# Patient Record
Sex: FEMALE | Race: WHITE | NOT HISPANIC OR LATINO | Employment: FULL TIME | URBAN - METROPOLITAN AREA
[De-identification: names, ages, dates, MRNs, and addresses within clinical notes are randomized per-mention and may not be internally consistent; named-entity substitution may affect disease eponyms.]

---

## 2023-12-20 ENCOUNTER — HOSPITAL ENCOUNTER (EMERGENCY)
Facility: CLINIC | Age: 23
Discharge: HOME | End: 2023-12-21
Attending: EMERGENCY MEDICINE | Admitting: EMERGENCY MEDICINE
Payer: COMMERCIAL

## 2023-12-20 ENCOUNTER — APPOINTMENT (OUTPATIENT)
Dept: GENERAL RADIOLOGY | Facility: CLINIC | Age: 23
End: 2023-12-20
Attending: EMERGENCY MEDICINE
Payer: COMMERCIAL

## 2023-12-20 DIAGNOSIS — R51.9 NONINTRACTABLE HEADACHE, UNSPECIFIED CHRONICITY PATTERN, UNSPECIFIED HEADACHE TYPE: ICD-10-CM

## 2023-12-20 LAB
ANION GAP SERPL CALCULATED.3IONS-SCNC: 11 MMOL/L (ref 7–15)
ATRIAL RATE - MUSE: 73 BPM
BASOPHILS # BLD AUTO: 0 10E3/UL (ref 0–0.2)
BASOPHILS NFR BLD AUTO: 1 %
BUN SERPL-MCNC: 11.2 MG/DL (ref 6–20)
CALCIUM SERPL-MCNC: 9.5 MG/DL (ref 8.6–10)
CHLORIDE SERPL-SCNC: 102 MMOL/L (ref 98–107)
CREAT SERPL-MCNC: 0.72 MG/DL (ref 0.51–0.95)
DEPRECATED HCO3 PLAS-SCNC: 26 MMOL/L (ref 22–29)
DIASTOLIC BLOOD PRESSURE - MUSE: NORMAL MMHG
EGFRCR SERPLBLD CKD-EPI 2021: >90 ML/MIN/1.73M2
EOSINOPHIL # BLD AUTO: 0.4 10E3/UL (ref 0–0.7)
EOSINOPHIL NFR BLD AUTO: 6 %
ERYTHROCYTE [DISTWIDTH] IN BLOOD BY AUTOMATED COUNT: 12.2 % (ref 10–15)
GLUCOSE SERPL-MCNC: 99 MG/DL (ref 70–99)
HCG SERPL QL: NEGATIVE
HCT VFR BLD AUTO: 38.1 % (ref 35–47)
HGB BLD-MCNC: 13.1 G/DL (ref 11.7–15.7)
HOLD SPECIMEN: NORMAL
IMM GRANULOCYTES # BLD: 0 10E3/UL
IMM GRANULOCYTES NFR BLD: 0 %
INTERPRETATION ECG - MUSE: NORMAL
LYMPHOCYTES # BLD AUTO: 2.3 10E3/UL (ref 0.8–5.3)
LYMPHOCYTES NFR BLD AUTO: 37 %
MCH RBC QN AUTO: 30.8 PG (ref 26.5–33)
MCHC RBC AUTO-ENTMCNC: 34.4 G/DL (ref 31.5–36.5)
MCV RBC AUTO: 90 FL (ref 78–100)
MONOCYTES # BLD AUTO: 0.4 10E3/UL (ref 0–1.3)
MONOCYTES NFR BLD AUTO: 7 %
NEUTROPHILS # BLD AUTO: 3 10E3/UL (ref 1.6–8.3)
NEUTROPHILS NFR BLD AUTO: 49 %
NRBC # BLD AUTO: 0 10E3/UL
NRBC BLD AUTO-RTO: 0 /100
P AXIS - MUSE: -11 DEGREES
PLATELET # BLD AUTO: 408 10E3/UL (ref 150–450)
POTASSIUM SERPL-SCNC: 4.1 MMOL/L (ref 3.4–5.3)
PR INTERVAL - MUSE: 104 MS
QRS DURATION - MUSE: 78 MS
QT - MUSE: 370 MS
QTC - MUSE: 407 MS
R AXIS - MUSE: -29 DEGREES
RBC # BLD AUTO: 4.25 10E6/UL (ref 3.8–5.2)
SODIUM SERPL-SCNC: 139 MMOL/L (ref 135–145)
SYSTOLIC BLOOD PRESSURE - MUSE: NORMAL MMHG
T AXIS - MUSE: -19 DEGREES
VENTRICULAR RATE- MUSE: 73 BPM
WBC # BLD AUTO: 6.1 10E3/UL (ref 4–11)

## 2023-12-20 PROCEDURE — 99285 EMERGENCY DEPT VISIT HI MDM: CPT | Mod: 25

## 2023-12-20 PROCEDURE — 36415 COLL VENOUS BLD VENIPUNCTURE: CPT | Performed by: EMERGENCY MEDICINE

## 2023-12-20 PROCEDURE — 80048 BASIC METABOLIC PNL TOTAL CA: CPT | Performed by: EMERGENCY MEDICINE

## 2023-12-20 PROCEDURE — 84703 CHORIONIC GONADOTROPIN ASSAY: CPT | Performed by: EMERGENCY MEDICINE

## 2023-12-20 PROCEDURE — 71046 X-RAY EXAM CHEST 2 VIEWS: CPT

## 2023-12-20 PROCEDURE — 85025 COMPLETE CBC W/AUTO DIFF WBC: CPT | Performed by: EMERGENCY MEDICINE

## 2023-12-20 PROCEDURE — 85014 HEMATOCRIT: CPT | Performed by: EMERGENCY MEDICINE

## 2023-12-20 PROCEDURE — 93005 ELECTROCARDIOGRAM TRACING: CPT

## 2023-12-21 VITALS
DIASTOLIC BLOOD PRESSURE: 76 MMHG | SYSTOLIC BLOOD PRESSURE: 122 MMHG | HEART RATE: 75 BPM | OXYGEN SATURATION: 99 % | TEMPERATURE: 98.1 F | WEIGHT: 150 LBS | RESPIRATION RATE: 16 BRPM

## 2023-12-21 LAB — TROPONIN T SERPL HS-MCNC: <6 NG/L

## 2023-12-21 PROCEDURE — 84484 ASSAY OF TROPONIN QUANT: CPT | Performed by: EMERGENCY MEDICINE

## 2023-12-21 PROCEDURE — 36415 COLL VENOUS BLD VENIPUNCTURE: CPT | Performed by: EMERGENCY MEDICINE

## 2023-12-21 PROCEDURE — 258N000003 HC RX IP 258 OP 636: Performed by: EMERGENCY MEDICINE

## 2023-12-21 RX ORDER — DEXAMETHASONE SODIUM PHOSPHATE 4 MG/ML
10 INJECTION, SOLUTION INTRA-ARTICULAR; INTRALESIONAL; INTRAMUSCULAR; INTRAVENOUS; SOFT TISSUE ONCE
Status: COMPLETED | OUTPATIENT
Start: 2023-12-21 | End: 2023-12-21

## 2023-12-21 RX ADMIN — SODIUM CHLORIDE, POTASSIUM CHLORIDE, SODIUM LACTATE AND CALCIUM CHLORIDE 1000 ML: 600; 310; 30; 20 INJECTION, SOLUTION INTRAVENOUS at 01:02

## 2023-12-21 ASSESSMENT — ACTIVITIES OF DAILY LIVING (ADL)
ADLS_ACUITY_SCORE: 35
ADLS_ACUITY_SCORE: 35

## 2023-12-21 NOTE — ED PROVIDER NOTES
History     Chief Complaint:  Headache and Chest Pain       The history is provided by the patient.      Mildred Muro is a 23 year old female with history of migraine who presents to the ED with headache and chest pain. Patient reports experiencing intermittent headache and chest pain for the past 10 days. Of note, headache feels different from migraine. She describes the headache as pressure on the right side and center of forehead. Additional symptoms include sensitivity to light and neck stiffness. Has been taking Tylenol and Excedrin for pain, but not today. Denies coughing, sore throat, nausea, and diarrhea. Patient moved 2 months ago and began experience clear drainage out of her nose intermittently. She has been seen for this a couple times and been given antibiotics for what seemed to be a sinus infection.      Independent Historian:   None - Patient Only    Review of External Notes:   I reviewed the [place] ED note from 8/31/23 for headache     Medications:    Tylenol   Excedrin  Inderal  Prozac    Past Medical History:    PCOS  Acne vulgaris  Astigmatism   Generalized anxiety disorder   Depression     Past Surgical History:    Tonsillectomy  Strabismus surgery, bilateral  [place] teeth extraction      Physical Exam   Patient Vitals for the past 24 hrs:   BP Temp Temp src Pulse Resp SpO2 Weight   12/21/23 0317 122/76 -- -- 75 -- 99 % --   12/20/23 2212 124/73 98.1  F (36.7  C) Temporal 74 16 99 % 68 kg (150 lb)     Physical Exam  General: Appears well-developed and well-nourished.   Head: No signs of trauma.   Mouth/Throat: Oropharynx is clear and moist.   Nose: No drainage noted  Eyes: Conjunctivae are normal. Pupils are equal, round, and reactive to light.   Neck: Normal range of motion. No nuchal rigidity. No cervical adenopathy  CV: Normal rate and regular rhythm.    Resp: Effort normal and breath sounds normal. No respiratory distress.   GI: Soft. There is no tenderness.  No rebound or guarding.   Normal bowel sounds.  No CVA tenderness.  MSK: Normal range of motion.   Neuro: The patient is alert and oriented to person, place, and time.  PERRLA, EOMI, strength in upper/lower extremities normal and symmetrical. Sensation normal. Speech normal.  Skin: Skin is warm and dry. No rash noted.   Psych: normal mood and affect. behavior is normal.       Emergency Department Course   ECG  ECG taken at 2220, ECG read at 2229  Sinus rhythm with short WI inferior infract, age undetermined   Rate 73 bpm. WI interval 104 ms. QRS duration 78 ms. QT/QTc 370/407 ms. P-R-T axes -11 -29 -19.     Imaging:  XR Chest 2 Views   Final Result   IMPRESSION: Negative chest.         Laboratory:  Labs Ordered and Resulted from Time of ED Arrival to Time of ED Departure   BASIC METABOLIC PANEL - Normal       Result Value    Sodium 139      Potassium 4.1      Chloride 102      Carbon Dioxide (CO2) 26      Anion Gap 11      Urea Nitrogen 11.2      Creatinine 0.72      GFR Estimate >90      Calcium 9.5      Glucose 99     TROPONIN T, HIGH SENSITIVITY - Normal    Troponin T, High Sensitivity <6     HCG QUALITATIVE PREGNANCY - Normal    hCG Serum Qualitative Negative     CBC WITH PLATELETS AND DIFFERENTIAL    WBC Count 6.1      RBC Count 4.25      Hemoglobin 13.1      Hematocrit 38.1      MCV 90      MCH 30.8      MCHC 34.4      RDW 12.2      Platelet Count 408      % Neutrophils 49      % Lymphocytes 37      % Monocytes 7      % Eosinophils 6      % Basophils 1      % Immature Granulocytes 0      NRBCs per 100 WBC 0      Absolute Neutrophils 3.0      Absolute Lymphocytes 2.3      Absolute Monocytes 0.4      Absolute Eosinophils 0.4      Absolute Basophils 0.0      Absolute Immature Granulocytes 0.0      Absolute NRBCs 0.0        Emergency Department Course & Assessments:    Interventions:  Medications   dexAMETHasone (DECADRON) injection 10 mg (10 mg Intravenous Not Given 12/21/23 0134)   lactated ringers BOLUS 1,000 mL (0 mLs Intravenous  Stopped 12/21/23 0148)      Assessments:  0022 I obtained the history and examined the patient as noted above.  0300 I rechecked and updated the patient.    Independent Interpretation (X-rays, CTs, rhythm strip):  On my independent interpretation of CXR there is no pneumothorax      Consultations/Discussion of Management or Tests:  0320 I consulted Dr. Gray (Neurology) regarding the patient.    Social Determinants of Health affecting care:   None    Disposition:  The patient was discharged to home.     Impression & Plan    Medical Decision Making:  Mildred Muro presents primarily complaining of chest pain.  She reports that she has had it intermittently over the last 10 days, and is worse when she stands up.  She also reported having some intermittent chest discomfort over this time as well.  At the time of my evaluation, the patient was feeling well and is not having symptoms.  While she had reported some neck stiffness, she was not having any neck stiffness or meningismus type symptoms on my exam.  No clear infectious type symptoms.  Patient was fully neurologically intact.  I did obtain an EKG, chest x-ray, blood work, which was reassuring.  I considered a CT scan, but did not feel it was indicated that she did not have any thunderclap type headache, and clinically I do not suspect intracranial hemorrhage.  Given the patient did have some postural nature of her headache and reported having some clear fluid, I considered the possibility of a spontaneous dural tear.  I have a lower suspicion for this as her symptoms seem to be overall fairly mild.  I was able to discuss the case with neurology, who agreed with initially doing supportive care, but if symptoms persist or there is continued concern, further evaluation with imaging such as an MRI could be considered.  Patient's headache sounds most consistent with a tension type headache.  Chest pain evaluation was reassuring.  Patient reports that the primary  reason she came to the emergency department is because she had recently moved to [place] and did not have a primary care doctor.  I did give her a referral for primary care doctor, and I also gave her information to follow-up with neurology and she was instructed to return for any worsening symptoms or further concerns.    Diagnosis:    ICD-10-CM    1. Nonintractable headache, unspecified chronicity pattern, unspecified headache type  R51.9 Primary Care Referral           Discharge Medications:  There are no discharge medications for this patient.     Scribe Disclosure:  I, Carlos Ferguson, am serving as a scribe at 12:36 AM on 12/21/2023 to document services personally performed by Shelton Nichols MD based on my observations and the provider's statements to me.     12/21/2023   Shelton Nichols MD Bergenstal, John A, MD  12/21/23 0793

## 2023-12-21 NOTE — ED TRIAGE NOTES
Pt arrives via triage presenting with intermittent headache and chest tightness for 10 days. Pt reports she just moved here and does not have a primary care doctor to see.      Triage Assessment (Adult)       Row Name 12/20/23 5358          Triage Assessment    Airway WDL WDL        Skin Circulation/Temperature WDL    Skin Circulation/Temperature WDL WDL        Cardiac WDL    Cardiac WDL X  chest tightness        Peripheral/Neurovascular WDL    Peripheral Neurovascular WDL WDL        Cognitive/Neuro/Behavioral WDL    Cognitive/Neuro/Behavioral WDL WDL

## 2024-02-11 ENCOUNTER — HEALTH MAINTENANCE LETTER (OUTPATIENT)
Age: 24
End: 2024-02-11

## 2025-03-08 ENCOUNTER — HEALTH MAINTENANCE LETTER (OUTPATIENT)
Age: 25
End: 2025-03-08